# Patient Record
Sex: MALE | Race: WHITE | NOT HISPANIC OR LATINO | Employment: FULL TIME | URBAN - METROPOLITAN AREA
[De-identification: names, ages, dates, MRNs, and addresses within clinical notes are randomized per-mention and may not be internally consistent; named-entity substitution may affect disease eponyms.]

---

## 2017-02-15 ENCOUNTER — ALLSCRIPTS OFFICE VISIT (OUTPATIENT)
Dept: OTHER | Facility: OTHER | Age: 32
End: 2017-02-15

## 2017-09-12 ENCOUNTER — ALLSCRIPTS OFFICE VISIT (OUTPATIENT)
Dept: OTHER | Facility: OTHER | Age: 32
End: 2017-09-12

## 2017-10-16 ENCOUNTER — ALLSCRIPTS OFFICE VISIT (OUTPATIENT)
Dept: OTHER | Facility: OTHER | Age: 32
End: 2017-10-16
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
## 2018-01-12 VITALS
DIASTOLIC BLOOD PRESSURE: 86 MMHG | WEIGHT: 216 LBS | HEIGHT: 73 IN | BODY MASS INDEX: 28.63 KG/M2 | SYSTOLIC BLOOD PRESSURE: 132 MMHG

## 2018-01-14 VITALS
WEIGHT: 218.38 LBS | BODY MASS INDEX: 29.58 KG/M2 | SYSTOLIC BLOOD PRESSURE: 138 MMHG | DIASTOLIC BLOOD PRESSURE: 86 MMHG | HEIGHT: 72 IN

## 2018-01-14 VITALS
DIASTOLIC BLOOD PRESSURE: 88 MMHG | BODY MASS INDEX: 29.82 KG/M2 | WEIGHT: 225 LBS | HEIGHT: 73 IN | SYSTOLIC BLOOD PRESSURE: 142 MMHG

## 2018-02-20 ENCOUNTER — OFFICE VISIT (OUTPATIENT)
Dept: FAMILY MEDICINE CLINIC | Facility: CLINIC | Age: 33
End: 2018-02-20
Payer: COMMERCIAL

## 2018-02-20 VITALS
DIASTOLIC BLOOD PRESSURE: 82 MMHG | SYSTOLIC BLOOD PRESSURE: 154 MMHG | TEMPERATURE: 98.8 F | BODY MASS INDEX: 29.83 KG/M2 | WEIGHT: 223 LBS

## 2018-02-20 DIAGNOSIS — J06.9 UPPER RESPIRATORY TRACT INFECTION, UNSPECIFIED TYPE: Primary | ICD-10-CM

## 2018-02-20 DIAGNOSIS — R05.9 COUGH: ICD-10-CM

## 2018-02-20 DIAGNOSIS — I10 BENIGN ESSENTIAL HYPERTENSION: ICD-10-CM

## 2018-02-20 PROBLEM — M24.80 JOINT CREPITUS: Status: ACTIVE | Noted: 2017-09-12

## 2018-02-20 PROBLEM — G47.00 INSOMNIA: Status: ACTIVE | Noted: 2017-09-12

## 2018-02-20 PROCEDURE — 99213 OFFICE O/P EST LOW 20 MIN: CPT | Performed by: NURSE PRACTITIONER

## 2018-02-20 RX ORDER — MOMETASONE FUROATE 1 MG/G
CREAM TOPICAL DAILY
COMMUNITY
Start: 2012-07-12 | End: 2018-02-20

## 2018-02-20 RX ORDER — ALBUTEROL SULFATE 90 UG/1
2 AEROSOL, METERED RESPIRATORY (INHALATION)
COMMUNITY
Start: 2012-07-12 | End: 2018-08-30 | Stop reason: SDUPTHER

## 2018-02-20 RX ORDER — PREDNISONE 20 MG/1
40 TABLET ORAL DAILY
Qty: 6 TABLET | Refills: 0 | Status: SHIPPED | OUTPATIENT
Start: 2018-02-20 | End: 2018-02-23

## 2018-02-20 RX ORDER — AZITHROMYCIN 250 MG/1
TABLET, FILM COATED ORAL
Qty: 6 TABLET | Refills: 0 | Status: SHIPPED | OUTPATIENT
Start: 2018-02-20 | End: 2018-02-24

## 2018-02-20 RX ORDER — ZOLPIDEM TARTRATE 12.5 MG/1
1 TABLET, FILM COATED, EXTENDED RELEASE ORAL
COMMUNITY
Start: 2017-10-16 | End: 2018-02-20

## 2018-02-20 NOTE — PROGRESS NOTES
Assessment/Plan:    Benign essential hypertension  Pressure is slightly elevated at today's visit  Most likely due to frequent use of decongestants  He was educated on safe cold medications to take that will not elevate blood pressure such as Coricidin HBP or Vicks HBP  Patient was on medication for hypertension in the past   Patient does have a blood pressure machine at home  He was educated to take his blood pressure a few times at home and let us know if it is consistently above 140s over 90s  Patient was not thrilled with the idea because he does not want to be on anything for his blood pressure because in the past he says it made him feel very out of it  He cancelled follow up with Dr Rachael Pop recently because he was sick  He said he will call to reschedule follow up  Upper Respiratory Infection and Cough:   He is to start prednisone 40 mg daily for 3 days and azithromycin 5 day course for symptoms  Patient was told to continue with over-the-counter medication if these help symptoms but to stay away from the decongestants that increase his blood pressure  He was advised to use the Coricidin HBP or Vicks HBP  He can continue to use Ventolin inhaler as needed  If he finds he is using it frequently he is to let us know and we can start him on something daily to keep the Ventolin PRN  He is to call if symptoms worsen or do not improve  A note for work was given  He also has stopped taking Ambien because it doesn't help him  He says he uses Zzzquil at night for a sleep aid since he has a weird work schedule and gets home at 3:00am and can't sleep  Patient was educated that Zzzquil is just benadryl but more expensive  He was advised that this can be used for short term insomnia but not long term like he's using it because he can build a tolerance to it  He says he uses it 2-3x a week   He is due for follow up with Dr Rachael Pop to address insomnia and other chronic problems which he will call and make appointment for  Diagnoses and all orders for this visit:    Upper respiratory tract infection, unspecified type  -     predniSONE 20 mg tablet; Take 2 tablets (40 mg total) by mouth daily for 3 days  -     azithromycin (ZITHROMAX) 250 mg tablet; Take 2 tablets on day 1 (500mg total), then 1 (250mg total) tablet for the following four days  Cough  -     predniSONE 20 mg tablet; Take 2 tablets (40 mg total) by mouth daily for 3 days    Benign essential hypertension    Other orders  -     Discontinue: mometasone (ELOCON) 0 1 % cream; Apply topically daily  -     albuterol (VENTOLIN HFA) 90 mcg/act inhaler; Inhale 2 puffs  -     Discontinue: zolpidem (AMBIEN CR) 12 5 MG CR tablet; Take 1 tablet by mouth        Patient Instructions   Start prednisone 40mg daily for 3 days total  Start antibiotic which is 2 pills on day 1, then 1 pill for following 4 days  Stop taking Nyquil and Dayquil and start taking Coricidin HBP or Vicks HBP which is much safer for your blood pressure  Can take plain mucinex to help drain sinuses and help cough up mucous  This works best when you drink a lot of water with it  Stay hydrated and rest    Please make follow up appointment with Dr Kiran Gonzalez  Subjective:        Patient ID: Bhanu Grijalva is a 34 y o  male  Chief Complaint   Patient presents with    Cough    Fatigue    Chills    Wheezing    Dizziness     x 4 days       Patient presents for symptoms of cough, fatigue, chills, wheezing, dizziness for 4 days  Patient had possible sinus infection symptoms for weeks 4 weeks ago  Has not taken antibiotic for that  He's been trying Alkaseltzer which helps with pressure and using nyquil and dayquil with no relief  He has days off next week and doesn't want to be sick for it  Cough   This is a new problem  Episode onset: Friday  The cough is productive of sputum   Associated symptoms include nasal congestion, rhinorrhea, a sore throat (mild), shortness of breath and wheezing  Pertinent negatives include no chest pain, chills (resolved), ear congestion, ear pain, fever (resolved), headaches, hemoptysis, postnasal drip or rash  Associated symptoms comments: Fatigue    Nothing aggravates the symptoms  Treatments tried: Alkaseltzer and dayquil and nyquil  Dizziness   Associated symptoms include congestion, coughing and a sore throat (mild)  Pertinent negatives include no abdominal pain, chest pain, chills (resolved), fever (resolved), headaches, nausea or rash  Review of Systems   Constitutional: Negative for chills (resolved) and fever (resolved)  HENT: Positive for congestion, rhinorrhea and sore throat (mild)  Negative for ear pain, postnasal drip, sinus pain and sinus pressure  Eyes: Negative for discharge  Respiratory: Positive for cough, shortness of breath and wheezing  Negative for hemoptysis  Cardiovascular: Negative for chest pain  Gastrointestinal: Negative for abdominal pain, constipation, diarrhea and nausea  Genitourinary: Negative for difficulty urinating and dysuria  Skin: Negative for rash  Neurological: Positive for dizziness (comes and goes, worse with exertion)  Negative for headaches  Psychiatric/Behavioral: Negative for agitation  Objective:  /82   Temp 98 8 °F (37 1 °C)   Wt 101 kg (223 lb)   BMI 29 83 kg/m²      Physical Exam   Constitutional: He is oriented to person, place, and time  He appears well-developed  No distress  HENT:   Head: Normocephalic and atraumatic  Right Ear: External ear normal    Left Ear: External ear normal    Nose: Nose normal    Mouth/Throat: Oropharynx is clear and moist  No oropharyngeal exudate  Eyes: Conjunctivae and lids are normal  Right eye exhibits no discharge  Left eye exhibits no discharge  Neck: Neck supple  No tracheal deviation present  Cardiovascular: Normal rate and regular rhythm  No murmur heard    Pulmonary/Chest: Effort normal and breath sounds normal  No respiratory distress  He has no wheezes  Rhonchi clears with coughing   Abdominal: Soft  Bowel sounds are normal  He exhibits no distension  There is no tenderness  There is no guarding  Musculoskeletal: He exhibits no edema or deformity  Lymphadenopathy:     He has no cervical adenopathy  Neurological: He is alert and oriented to person, place, and time  Skin: Skin is warm and dry  No rash noted  He is not diaphoretic  No erythema  Psychiatric: He has a normal mood and affect  His speech is normal and behavior is normal  Judgment and thought content normal  Cognition and memory are normal    Nursing note and vitals reviewed

## 2018-02-20 NOTE — ASSESSMENT & PLAN NOTE
Pressure is slightly elevated at today's visit  Most likely due to frequent use of decongestants  He was educated on safe cold medications to take that will not elevate blood pressure such as Coricidin HBP or Vicks HBP  Patient was on medication for hypertension in the past   Patient does have a blood pressure machine at home  He was educated to take his blood pressure a few times at home and let us know if it is consistently above 140s over 90s  Patient was not thrilled with the idea because he does not want to be on anything for his blood pressure because in the past he says it made him feel very out of it  He cancelled follow up with Dr Vannessa Kinsey recently because he was sick  He said he will call to reschedule follow up

## 2018-02-20 NOTE — PATIENT INSTRUCTIONS
Start prednisone 40mg daily for 3 days total  Start antibiotic which is 2 pills on day 1, then 1 pill for following 4 days  Stop taking Nyquil and Dayquil and start taking Coricidin HBP or Vicks HBP which is much safer for your blood pressure  Can take plain mucinex to help drain sinuses and help cough up mucous  This works best when you drink a lot of water with it  Stay hydrated and rest  Take blood pressure at home and call if consistently elevated >140/90  Please make follow up appointment with Dr Calvin Stanton

## 2018-02-20 NOTE — LETTER
February 20, 2018     Patient: Gretel Denise   YOB: 1985   Date of Visit: 2/20/2018       To Whom it May Concern:    Nilam Braswell is under my professional care  He was seen in my office on 2/20/2018  He may return to work on 2/22/18  If you have any questions or concerns, please don't hesitate to call           Sincerely,          FRANCISCO Crain        CC: No Recipients

## 2018-08-30 ENCOUNTER — OFFICE VISIT (OUTPATIENT)
Dept: FAMILY MEDICINE CLINIC | Facility: CLINIC | Age: 33
End: 2018-08-30
Payer: COMMERCIAL

## 2018-08-30 VITALS — DIASTOLIC BLOOD PRESSURE: 88 MMHG | SYSTOLIC BLOOD PRESSURE: 156 MMHG | BODY MASS INDEX: 28.52 KG/M2 | WEIGHT: 213.2 LBS

## 2018-08-30 DIAGNOSIS — F41.9 ANXIETY: Primary | ICD-10-CM

## 2018-08-30 DIAGNOSIS — R03.0 ELEVATED BLOOD PRESSURE, SITUATIONAL: ICD-10-CM

## 2018-08-30 PROCEDURE — 99213 OFFICE O/P EST LOW 20 MIN: CPT | Performed by: FAMILY MEDICINE

## 2018-08-30 RX ORDER — CITALOPRAM 10 MG/1
10 TABLET ORAL DAILY
Qty: 30 TABLET | Refills: 3 | Status: SHIPPED | OUTPATIENT
Start: 2018-08-30 | End: 2018-09-27 | Stop reason: SDUPTHER

## 2018-08-30 RX ORDER — ALBUTEROL SULFATE 90 UG/1
2 AEROSOL, METERED RESPIRATORY (INHALATION) EVERY 6 HOURS PRN
COMMUNITY
End: 2019-01-14 | Stop reason: SDUPTHER

## 2018-08-30 NOTE — PROGRESS NOTES
Assessment/Plan:  Chief Complaint   Patient presents with    Anxiety     Patient Instructions   Here for anxiety and insomnia and elevated BP  Start Citalopram 10 mg once daily and call if any problems  No problem-specific Assessment & Plan notes found for this encounter  Diagnoses and all orders for this visit:    Anxiety  -     citalopram (CeleXA) 10 mg tablet; Take 1 tablet (10 mg total) by mouth daily    Elevated blood pressure, situational    Other orders  -     albuterol (PROVENTIL HFA,VENTOLIN HFA) 90 mcg/act inhaler; Inhale 2 puffs every 6 (six) hours as needed for wheezing          Subjective:      Patient ID: Zahra Muller is a 28 y o  male  Here for anxiety and stress  Anxiety             The following portions of the patient's history were reviewed and updated as appropriate: allergies, current medications, past family history, past medical history, past social history, past surgical history and problem list     Review of Systems   Constitutional: Negative  HENT: Negative  Eyes: Negative  Respiratory: Negative  Cardiovascular: Negative  Gastrointestinal: Negative  Endocrine: Negative  Genitourinary: Negative  Musculoskeletal: Negative  Skin: Negative  Allergic/Immunologic: Negative  Neurological: Negative  Hematological: Negative  Psychiatric/Behavioral:        Anxiety         Objective:      /88 (BP Location: Left arm, Patient Position: Sitting, Cuff Size: Standard)   Wt 96 7 kg (213 lb 3 2 oz)   BMI 28 52 kg/m²          Physical Exam   Constitutional: He is oriented to person, place, and time  He appears well-developed and well-nourished  HENT:   Head: Normocephalic and atraumatic  Right Ear: External ear normal    Left Ear: External ear normal    Nose: Nose normal    Mouth/Throat: Oropharynx is clear and moist    Eyes: Conjunctivae and EOM are normal  Pupils are equal, round, and reactive to light     Neck: Normal range of motion  Neck supple  Cardiovascular: Normal rate, regular rhythm, normal heart sounds and intact distal pulses  Pulmonary/Chest: Effort normal and breath sounds normal    Musculoskeletal: Normal range of motion  Neurological: He is alert and oriented to person, place, and time  He has normal reflexes  Skin: Skin is warm and dry     Psychiatric: His behavior is normal    Anxiety

## 2018-08-30 NOTE — PATIENT INSTRUCTIONS
Here for anxiety and insomnia and elevated BP  Start Citalopram 10 mg once daily and call if any problems

## 2018-09-27 ENCOUNTER — OFFICE VISIT (OUTPATIENT)
Dept: FAMILY MEDICINE CLINIC | Facility: CLINIC | Age: 33
End: 2018-09-27
Payer: COMMERCIAL

## 2018-09-27 VITALS
WEIGHT: 213.2 LBS | SYSTOLIC BLOOD PRESSURE: 138 MMHG | DIASTOLIC BLOOD PRESSURE: 88 MMHG | HEIGHT: 72 IN | BODY MASS INDEX: 28.88 KG/M2

## 2018-09-27 DIAGNOSIS — J32.9 SINUSITIS, UNSPECIFIED CHRONICITY, UNSPECIFIED LOCATION: Primary | ICD-10-CM

## 2018-09-27 DIAGNOSIS — J45.909 UNCOMPLICATED ASTHMA, UNSPECIFIED ASTHMA SEVERITY, UNSPECIFIED WHETHER PERSISTENT: ICD-10-CM

## 2018-09-27 DIAGNOSIS — F41.9 ANXIETY: ICD-10-CM

## 2018-09-27 PROCEDURE — 3008F BODY MASS INDEX DOCD: CPT | Performed by: FAMILY MEDICINE

## 2018-09-27 PROCEDURE — 99214 OFFICE O/P EST MOD 30 MIN: CPT | Performed by: FAMILY MEDICINE

## 2018-09-27 RX ORDER — FLUTICASONE PROPIONATE 50 MCG
1 SPRAY, SUSPENSION (ML) NASAL DAILY
Qty: 16 G | Refills: 0 | Status: SHIPPED | OUTPATIENT
Start: 2018-09-27

## 2018-09-27 RX ORDER — CITALOPRAM 10 MG/1
10 TABLET ORAL DAILY
Qty: 30 TABLET | Refills: 5 | Status: SHIPPED | OUTPATIENT
Start: 2018-09-27

## 2018-09-27 RX ORDER — CEFDINIR 300 MG/1
300 CAPSULE ORAL EVERY 12 HOURS SCHEDULED
Qty: 14 CAPSULE | Refills: 0 | Status: SHIPPED | OUTPATIENT
Start: 2018-09-27 | End: 2018-10-04

## 2018-09-27 NOTE — PROGRESS NOTES
Assessment/Plan:  Chief Complaint   Patient presents with    Follow-up    Anxiety     Patient Instructions   Anxiety better and use abx and flonase as directed for sinusitis  Asthma stable  No problem-specific Assessment & Plan notes found for this encounter  Diagnoses and all orders for this visit:    Sinusitis, unspecified chronicity, unspecified location  -     fluticasone (FLONASE) 50 mcg/act nasal spray; 1 spray into each nostril daily  -     cefdinir (OMNICEF) 300 mg capsule; Take 1 capsule (300 mg total) by mouth every 12 (twelve) hours for 7 days    Anxiety  -     citalopram (CeleXA) 10 mg tablet; Take 1 tablet (10 mg total) by mouth daily    Uncomplicated asthma, unspecified asthma severity, unspecified whether persistent          Subjective:      Patient ID: Joe Sarakr is a 28 y o  male  Here for Anxiety and doing better and takes Citalopram 10 mg daily and has no side effects  He feels much better  No cp or sob, or ha  The following portions of the patient's history were reviewed and updated as appropriate: allergies, current medications, past family history, past medical history, past social history, past surgical history and problem list     Review of Systems   Constitutional: Negative  HENT: Negative  Eyes: Negative  Respiratory: Negative  Cardiovascular: Negative  Gastrointestinal: Negative  Endocrine: Negative  Genitourinary: Negative  Musculoskeletal: Negative  Skin: Negative  Allergic/Immunologic: Negative  Neurological: Negative  Hematological: Negative  Psychiatric/Behavioral: Negative  Anxiety better         Objective:      /88   Ht 5' 11 5" (1 816 m)   Wt 96 7 kg (213 lb 3 2 oz)   BMI 29 32 kg/m²          Physical Exam   Constitutional: He is oriented to person, place, and time  He appears well-developed and well-nourished  HENT:   Head: Normocephalic and atraumatic     Right Ear: External ear normal  Left Ear: External ear normal    Nose: Nose normal    Mouth/Throat: Oropharynx is clear and moist    Eyes: Pupils are equal, round, and reactive to light  Conjunctivae and EOM are normal    Neck: Normal range of motion  Neck supple  Cardiovascular: Normal rate, regular rhythm, normal heart sounds and intact distal pulses  Pulmonary/Chest: Effort normal and breath sounds normal    Musculoskeletal: Normal range of motion  Neurological: He is alert and oriented to person, place, and time  He has normal reflexes  Skin: Skin is warm and dry  Psychiatric: He has a normal mood and affect   His behavior is normal    Anxiety better

## 2018-12-14 ENCOUNTER — TELEPHONE (OUTPATIENT)
Dept: FAMILY MEDICINE CLINIC | Facility: CLINIC | Age: 33
End: 2018-12-14
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
## 2019-01-14 ENCOUNTER — OFFICE VISIT (OUTPATIENT)
Dept: FAMILY MEDICINE CLINIC | Facility: CLINIC | Age: 34
End: 2019-01-14
Payer: COMMERCIAL

## 2019-01-14 VITALS
DIASTOLIC BLOOD PRESSURE: 92 MMHG | SYSTOLIC BLOOD PRESSURE: 144 MMHG | HEIGHT: 72 IN | WEIGHT: 237.4 LBS | BODY MASS INDEX: 32.15 KG/M2

## 2019-01-14 DIAGNOSIS — R05.9 COUGH: ICD-10-CM

## 2019-01-14 DIAGNOSIS — I10 BENIGN ESSENTIAL HYPERTENSION: ICD-10-CM

## 2019-01-14 DIAGNOSIS — F41.9 ANXIETY: ICD-10-CM

## 2019-01-14 DIAGNOSIS — J45.909 UNCOMPLICATED ASTHMA, UNSPECIFIED ASTHMA SEVERITY, UNSPECIFIED WHETHER PERSISTENT: Primary | ICD-10-CM

## 2019-01-14 PROCEDURE — 99214 OFFICE O/P EST MOD 30 MIN: CPT | Performed by: FAMILY MEDICINE

## 2019-01-14 PROCEDURE — 3008F BODY MASS INDEX DOCD: CPT | Performed by: FAMILY MEDICINE

## 2019-01-14 RX ORDER — PREDNISONE 10 MG/1
TABLET ORAL
Qty: 21 TABLET | Refills: 0 | Status: SHIPPED | OUTPATIENT
Start: 2019-01-14

## 2019-01-14 RX ORDER — ALBUTEROL SULFATE 90 UG/1
2 AEROSOL, METERED RESPIRATORY (INHALATION) EVERY 6 HOURS PRN
Qty: 2 INHALER | Refills: 2 | Status: SHIPPED | OUTPATIENT
Start: 2019-01-14

## 2019-01-14 RX ORDER — TRIAMCINOLONE ACETONIDE 1 MG/G
CREAM TOPICAL
Refills: 0 | COMMUNITY
Start: 2018-12-28

## 2019-01-14 RX ORDER — CEFDINIR 300 MG/1
300 CAPSULE ORAL EVERY 12 HOURS SCHEDULED
Qty: 14 CAPSULE | Refills: 0 | Status: SHIPPED | OUTPATIENT
Start: 2019-01-14 | End: 2019-01-21

## 2019-01-14 NOTE — PROGRESS NOTES
Assessment/Plan:  Chief Complaint   Patient presents with    Follow-up    Anxiety     no taking medication    Asthma     flaring up for the past 5 weeks  Went through 2 inhalers, but is helping   Cough     Patient Instructions   Lose weight as directed and monitor BP and also recheck BP in 1 month  Asthma symptoms, start abx for hx of cough/uri and start prednisone and also refilled inhaler  Lose weight as directed  Anxiety stable, take Citalopram 10 mg daily as directed, he was not using it as directed for anxiety  No problem-specific Assessment & Plan notes found for this encounter  Diagnoses and all orders for this visit:    Uncomplicated asthma, unspecified asthma severity, unspecified whether persistent  -     predniSONE 10 mg tablet; Take 60 mg po day#1, 50 mg po day#2, 40 mg po day#3, 30 mg po day#4, 20 mg po day#5m and 10 mg po day#6  -     albuterol (PROVENTIL HFA,VENTOLIN HFA) 90 mcg/act inhaler; Inhale 2 puffs every 6 (six) hours as needed for wheezing    Anxiety    Cough  -     predniSONE 10 mg tablet; Take 60 mg po day#1, 50 mg po day#2, 40 mg po day#3, 30 mg po day#4, 20 mg po day#5m and 10 mg po day#6  -     cefdinir (OMNICEF) 300 mg capsule; Take 1 capsule (300 mg total) by mouth every 12 (twelve) hours for 7 days    Benign essential hypertension          Subjective:      Patient ID: Randy Partida is a 28 y o  male  Follow-up   Anxiety (no taking medication)  Asthma (flaring up for the past 5 weeks  Went through 2 inhalers, but is helping )  Cough     Was sick and coughing bad and fever went away but asthma is getting better  The following portions of the patient's history were reviewed and updated as appropriate: allergies, current medications, past family history, past medical history, past social history, past surgical history and problem list     Review of Systems   Constitutional: Negative  HENT: Negative  Eyes: Negative      Respiratory:        Cough Cardiovascular: Negative  Gastrointestinal: Negative  Endocrine: Negative  Genitourinary: Negative  Musculoskeletal: Negative  Skin: Negative  Allergic/Immunologic: Negative  Neurological: Negative  Hematological: Negative  Psychiatric/Behavioral:        Anxiety         Objective:      /92   Ht 5' 11 5" (1 816 m)   Wt 108 kg (237 lb 6 4 oz)   BMI 32 65 kg/m²          Physical Exam   Constitutional: He is oriented to person, place, and time  He appears well-developed and well-nourished  HENT:   Head: Normocephalic and atraumatic  Right Ear: External ear normal    Left Ear: External ear normal    Nose: Nose normal    Mouth/Throat: Oropharynx is clear and moist    Eyes: Pupils are equal, round, and reactive to light  Conjunctivae and EOM are normal    Neck: Normal range of motion  Neck supple  Cardiovascular: Normal rate, regular rhythm, normal heart sounds and intact distal pulses  Pulmonary/Chest: Effort normal and breath sounds normal    Musculoskeletal: Normal range of motion  Neurological: He is alert and oriented to person, place, and time  He has normal reflexes  Skin: Skin is warm and dry  Psychiatric: He has a normal mood and affect   His behavior is normal    Anxiety stable

## 2019-01-14 NOTE — PATIENT INSTRUCTIONS
Lose weight as directed and monitor BP and also recheck BP in 1 month  Asthma symptoms, start abx for hx of cough/uri and start prednisone and also refilled inhaler  Lose weight as directed  Anxiety stable, take Citalopram 10 mg daily as directed, he was not using it as directed for anxiety

## 2021-09-05 ENCOUNTER — OFFICE VISIT (OUTPATIENT)
Dept: URGENT CARE | Age: 36
End: 2021-09-05
Payer: COMMERCIAL

## 2021-09-05 VITALS
HEART RATE: 113 BPM | HEIGHT: 72 IN | OXYGEN SATURATION: 96 % | SYSTOLIC BLOOD PRESSURE: 134 MMHG | BODY MASS INDEX: 29.8 KG/M2 | TEMPERATURE: 100.1 F | WEIGHT: 220 LBS | DIASTOLIC BLOOD PRESSURE: 99 MMHG

## 2021-09-05 DIAGNOSIS — Z11.59 SPECIAL SCREENING EXAMINATION FOR UNSPECIFIED VIRAL DISEASE: ICD-10-CM

## 2021-09-05 DIAGNOSIS — B34.9 VIRAL SYNDROME: Primary | ICD-10-CM

## 2021-09-05 PROCEDURE — 99213 OFFICE O/P EST LOW 20 MIN: CPT | Performed by: PHYSICIAN ASSISTANT

## 2021-09-05 PROCEDURE — 87635 SARS-COV-2 COVID-19 AMP PRB: CPT | Performed by: PHYSICIAN ASSISTANT

## 2021-09-05 NOTE — PATIENT INSTRUCTIONS
Check or sign up for [facility] my Chart to view your results  We do not call patient's with negative results  Go directly home after today's visit, quarantine until you receive a negative result  If you have a positive result you need to quarantine at home for a minimum of 10 days  You may end your quarantine when you are symptoms free without medications to reduce fever (e g  acetaminophen/Tylenol) for 72 hours  Recommend over the counter antihistamines such as Claratin, Allegra, Zyrtec, or Benadryl for congestion  You may also use over the counter nasal sprays such as Flonase for this  Over the counter lozenges such as Cepacol, Ricola, Halls, or Chloroseptic can be used for sore throat symptoms  Recommend Vitamin C 1,000 mg twice daily, Vitamin D3 2000 IU daily, multivitamin and Zinc for immune support  If your symptoms worsen or you develop shortness of breath report to the nearest emergency room  Check cdc gov for most current guidelines as guidelines are subject to change as we learn more about the virus  [address]    Your healthcare provider and/or public health staff have evaluated you and have determined that you do not need to remain in the hospital at this time  At this time you can be isolated at home where you will be monitored by staff from your local or state health department  You should carefully follow the prevention and isolation steps below until a healthcare provider or local or state health department says that you can return to your normal activities  Stay home except to get medical care    People who are mildly ill with COVID-19 are able to isolate at home during their illness  You should restrict activities outside your home, except for getting medical care  Do not go to work, school, or public areas  Avoid using public transportation, ride-sharing, or taxis      Separate yourself from other people and animals in your home    People: As much as possible, you should stay in a specific room and away from other people in your home  Also, you should use a separate bathroom, if available  Animals: You should restrict contact with pets and other animals while you are sick with COVID-19, just like you would around other people  Although there have not been reports of pets or other animals becoming sick with COVID-19, it is still recommended that people sick with COVID-19 limit contact with animals until more information is known about the virus  When possible, have another member of your household care for your animals while you are sick  If you are sick with COVID-19, avoid contact with your pet, including petting, snuggling, being kissed or licked, and sharing food  If you must care for your pet or be around animals while you are sick, wash your hands before and after you interact with pets and wear a facemask  See COVID-19 and Animals for more information  Call ahead before visiting your doctor    If you have a medical appointment, call the healthcare provider and tell them that you have or may have COVID-19  This will help the healthcare providers office take steps to keep other people from getting infected or exposed  Wear a facemask    You should wear a facemask when you are around other people (e g , sharing a room or vehicle) or pets and before you enter a healthcare providers office  If you are not able to wear a facemask (for example, because it causes trouble breathing), then people who live with you should not stay in the same room with you, or they should wear a facemask if they enter your room  Cover your coughs and sneezes    Cover your mouth and nose with a tissue when you cough or sneeze  Throw used tissues in a lined trash can   Immediately wash your hands with soap and water for at least 20 seconds or, if soap and water are not available, clean your hands with an alcohol-based hand  that contains at least 60% alcohol  Clean your hands often    Wash your hands often with soap and water for at least 20 seconds, especially after blowing your nose, coughing, or sneezing; going to the bathroom; and before eating or preparing food  If soap and water are not readily available, use an alcohol-based hand  with at least 60% alcohol, covering all surfaces of your hands and rubbing them together until they feel dry  Soap and water are the best option if hands are visibly dirty  Avoid touching your eyes, nose, and mouth with unwashed hands  Avoid sharing personal household items    You should not share dishes, drinking glasses, cups, eating utensils, towels, or bedding with other people or pets in your home  After using these items, they should be washed thoroughly with soap and water  Clean all high-touch surfaces everyday    High touch surfaces include counters, tabletops, doorknobs, bathroom fixtures, toilets, phones, keyboards, tablets, and bedside tables  Also, clean any surfaces that may have blood, stool, or body fluids on them  Use a household cleaning spray or wipe, according to the label instructions  Labels contain instructions for safe and effective use of the cleaning product including precautions you should take when applying the product, such as wearing gloves and making sure you have good ventilation during use of the product  Monitor your symptoms    Seek prompt medical attention if your illness is worsening (e g , difficulty breathing)  Before seeking care, call your healthcare provider and tell them that you have, or are being evaluated for, COVID-19  Put on a facemask before you enter the facility  These steps will help the healthcare providers office to keep other people in the office or waiting room from getting infected or exposed  Ask your healthcare provider to call the local or state health department   Persons who are placed under active monitoring or facilitated self-monitoring should follow instructions provided by their local health department or occupational health professionals, as appropriate  If you have a medical emergency and need to call 911, notify the dispatch personnel that you have, or are being evaluated for COVID-19  If possible, put on a facemask before emergency medical services arrive  Discontinuing home isolation    Patients with confirmed COVID-19 should remain under home isolation precautions until the following conditions are met:   - They have had no fever for at least 24 hours (that is one full day of no fever without the use medicine that reduces fevers)  AND  - other symptoms have improved (for example, when their cough or shortness of breath have improved)  AND  - If had mild or moderate illness, at least 10 days have passed since their symptoms first appeared or if severe illness (needed oxygen) or immunosuppressed, at least 20 days have passed since symptoms first appeared  Patients with confirmed COVID-19 should also notify close contacts (including their workplace) and ask that they self-quarantine  Currently, close contact is defined as being within 6 feet for 15 minutes or more from the period 24 hours starting 48 hours before symptom onset to the time at which the patient went into isolation  Close contacts of patients diagnosed with COVID-19 should be instructed by the patient to self-quarantine for 14 days from the last time of their last contact with the patient       Source: [employer]

## 2021-09-05 NOTE — LETTER
September 5, 2021     Patient: Kartik Bills   YOB: 1985   Date of Visit: 9/5/2021       To Whom It May Concern: It is my medical opinion that Kuldeep  should remain out of work until negative test result  Pt may work from home if remote work is available       If you have any questions or concerns, please don't hesitate to call           Sincerely,        Ariel Lilly PA-C    CC: No Recipients

## 2021-09-06 LAB — SARS-COV-2 RNA RESP QL NAA+PROBE: NEGATIVE

## 2021-09-06 NOTE — PROGRESS NOTES
[facility]        NAME: Sol Velasquez is a 39 y o  male  : 1985    MRN: [id number]  DATE: 2021  TIME: 10:19 PM    Assessment and Plan   Viral syndrome [B34 9]  1  Viral syndrome  Novel Coronavirus (Covid-19),PCR [facility] - Office Collection   2  Special screening examination for unspecified viral disease     Pt presents with symptoms consistent with possible COVID 19 infection  I also offered flu testing at this time patient declines  Pt will be tested in accordance with CDC guidelines and recommendations for symptomatic individuals regardless of vaccination status  We discussed quarantine protocols and symptomatic treatments  He is instructed to take Tylenol as needed for fever and body aches  He will ensure good fluid intake and rest  He is instructed to remain out of work for duration of fever even if COVID test is negative  The pt may follow-up with their PCP if symptoms are not improved in 2-3 days and COVID test is negative  Pt will report to the emergency department if symptoms worsen  Patient Instructions     Patient Instructions   Check or sign up for [facility]  Chart to view your results  We do not call patient's with negative results  Go directly home after today's visit, quarantine until you receive a negative result  If you have a positive result you need to quarantine at home for a minimum of 10 days  You may end your quarantine when you are symptoms free without medications to reduce fever (e g  acetaminophen/Tylenol) for 72 hours  Recommend over the counter antihistamines such as Claratin, Allegra, Zyrtec, or Benadryl for congestion  You may also use over the counter nasal sprays such as Flonase for this  Over the counter lozenges such as Cepacol, Ricola, Halls, or Chloroseptic can be used for sore throat symptoms  Recommend Vitamin C 1,000 mg twice daily, Vitamin D3 2000 IU daily, multivitamin and Zinc for immune support     If your symptoms worsen or you develop shortness of breath report to the nearest emergency room  Check cdc gov for most current guidelines as guidelines are subject to change as we learn more about the virus  [address]    Your healthcare provider and/or public health staff have evaluated you and have determined that you do not need to remain in the hospital at this time  At this time you can be isolated at home where you will be monitored by staff from your local or state health department  You should carefully follow the prevention and isolation steps below until a healthcare provider or local or state health department says that you can return to your normal activities  Stay home except to get medical care    People who are mildly ill with COVID-19 are able to isolate at home during their illness  You should restrict activities outside your home, except for getting medical care  Do not go to work, school, or public areas  Avoid using public transportation, ride-sharing, or taxis  Separate yourself from other people and animals in your home    People: As much as possible, you should stay in a specific room and away from other people in your home  Also, you should use a separate bathroom, if available  Animals: You should restrict contact with pets and other animals while you are sick with COVID-19, just like you would around other people  Although there have not been reports of pets or other animals becoming sick with COVID-19, it is still recommended that people sick with COVID-19 limit contact with animals until more information is known about the virus  When possible, have another member of your household care for your animals while you are sick  If you are sick with COVID-19, avoid contact with your pet, including petting, snuggling, being kissed or licked, and sharing food   If you must care for your pet or be around animals while you are sick, wash your hands before and after you interact with pets and wear a facemask  See COVID-19 and Animals for more information  Call ahead before visiting your doctor    If you have a medical appointment, call the healthcare provider and tell them that you have or may have COVID-19  This will help the healthcare providers office take steps to keep other people from getting infected or exposed  Wear a facemask    You should wear a facemask when you are around other people (e g , sharing a room or vehicle) or pets and before you enter a healthcare providers office  If you are not able to wear a facemask (for example, because it causes trouble breathing), then people who live with you should not stay in the same room with you, or they should wear a facemask if they enter your room  Cover your coughs and sneezes    Cover your mouth and nose with a tissue when you cough or sneeze  Throw used tissues in a lined trash can  Immediately wash your hands with soap and water for at least 20 seconds or, if soap and water are not available, clean your hands with an alcohol-based hand  that contains at least 60% alcohol  Clean your hands often    Wash your hands often with soap and water for at least 20 seconds, especially after blowing your nose, coughing, or sneezing; going to the bathroom; and before eating or preparing food  If soap and water are not readily available, use an alcohol-based hand  with at least 60% alcohol, covering all surfaces of your hands and rubbing them together until they feel dry  Soap and water are the best option if hands are visibly dirty  Avoid touching your eyes, nose, and mouth with unwashed hands  Avoid sharing personal household items    You should not share dishes, drinking glasses, cups, eating utensils, towels, or bedding with other people or pets in your home  After using these items, they should be washed thoroughly with soap and water      Clean all high-touch surfaces everyday    High touch surfaces include counters, tabletops, doorknobs, bathroom fixtures, toilets, phones, keyboards, tablets, and bedside tables  Also, clean any surfaces that may have blood, stool, or body fluids on them  Use a household cleaning spray or wipe, according to the label instructions  Labels contain instructions for safe and effective use of the cleaning product including precautions you should take when applying the product, such as wearing gloves and making sure you have good ventilation during use of the product  Monitor your symptoms    Seek prompt medical attention if your illness is worsening (e g , difficulty breathing)  Before seeking care, call your healthcare provider and tell them that you have, or are being evaluated for, COVID-19  Put on a facemask before you enter the facility  These steps will help the healthcare providers office to keep other people in the office or waiting room from getting infected or exposed  Ask your healthcare provider to call the local or [facility] health department  Persons who are placed under active monitoring or facilitated self-monitoring should follow instructions provided by their local health department or occupational health professionals, as appropriate  If you have a medical emergency and need to call 911, notify the dispatch personnel that you have, or are being evaluated for COVID-19  If possible, put on a facemask before emergency medical services arrive      Discontinuing home isolation    Patients with confirmed COVID-19 should remain under home isolation precautions until the following conditions are met:   - They have had no fever for at least 24 hours (that is one full day of no fever without the use medicine that reduces fevers)  AND  - other symptoms have improved (for example, when their cough or shortness of breath have improved)  AND  - If had mild or moderate illness, at least 10 days have passed since their symptoms first appeared or if severe illness (needed oxygen) or immunosuppressed, at least 20 days have passed since symptoms first appeared  Patients with confirmed COVID-19 should also notify close contacts (including their workplace) and ask that they self-quarantine  Currently, close contact is defined as being within 6 feet for 15 minutes or more from the period 24 hours starting 48 hours before symptom onset to the time at which the patient went into isolation  Close contacts of patients diagnosed with COVID-19 should be instructed by the patient to self-quarantine for 14 days from the last time of their last contact with the patient  Source: [employer]         Follow up with PCP in 3-5 days  Proceed to  ER if symptoms worsen  Chief Complaint     Chief Complaint   Patient presents with   31 [id number]     started a few days ago  took motrin yesterday for head, also flu pm to sleep, body ache, feels warm, no sore throat or congestion - not vaccinated- very fatigue          History of Present Illness        39year old male presents with complaints of  Fever, chills, body aches, and fatigue for 3 days duration  Pt denies  runny nose, sore throat, cough, headache, shortness of breath, chest pain, nausea, vomiting, diarrhea,and loss of taste and smell  Pt has not been exposed to anyone who has tested positive for COVID to their knowledge  Patient states that he does occasionally work in [place] and has been out of the state  He denies history of asthma He denies smoking and use of e-cigarettes  He has taken Motrin with minimal benefit at this time  He is not vaccinated against COVID 19  No other concerns or complaints today  Review of Systems   Review of Systems   Constitutional: Positive for chills, fatigue and fever  HENT: Negative for congestion, postnasal drip, rhinorrhea and sore throat  Respiratory: Negative for cough and shortness of breath  Cardiovascular: Negative for chest pain  Gastrointestinal: Negative for diarrhea, nausea and vomiting  Musculoskeletal: Positive for myalgias  Neurological: Negative for headaches  Current Medications       Current Outpatient Medications:     albuterol (PROVENTIL HFA,VENTOLIN HFA) 90 mcg/act inhaler, Inhale 2 puffs every 6 (six) hours as needed for wheezing, Disp: 2 Inhaler, Rfl: 2    citalopram (CeleXA) 10 mg tablet, Take 1 tablet (10 mg total) by mouth daily (Patient not taking: Reported on 1/14/2019 ), Disp: 30 tablet, Rfl: 5    fluticasone (FLONASE) 50 mcg/act nasal spray, 1 spray into each nostril daily (Patient not taking: Reported on 1/14/2019 ), Disp: 16 g, Rfl: 0    predniSONE 10 mg tablet, Take 60 mg po day#1, 50 mg po day#2, 40 mg po day#3, 30 mg po day#4, 20 mg po day#5m and 10 mg po day#6 (Patient not taking: Reported on 9/5/2021), Disp: 21 tablet, Rfl: 0    triamcinolone (KENALOG) 0 1 % cream, APPLY TO NECK ONCE A WEEK AFTER SHAVING (Patient not taking: Reported on 9/5/2021), Disp: , Rfl: 0    Current Allergies     Allergies as of 09/05/2021    (No Known Allergies)            The following portions of the patient's history were reviewed and updated as appropriate: allergies, current medications, past family history, past medical history, past social history, past surgical history and problem list      Past Medical History:   Diagnosis Date    Contact dermatitis     Last assessed - 7/12/12    Fatigue     Last assessed - 10/29/13    Insomnia     Last assessed - 9/25/12    Other muscle spasm     Resolved - 2/20/15       Past Surgical History:   Procedure Laterality Date    WISDOM TOOTH EXTRACTION         Family History   Problem Relation Age of Onset    No Known Problems Mother     Hypertension Father     Diabetes Father     No Known Problems Family          Medications have been verified          Objective   /99   Pulse (!) 113   Temp 100 1 °F (37 8 °C)   Ht 6' (1 829 m)   Wt 99 8 kg (220 lb)   SpO2 96% BMI 29 84 kg/m²   No LMP for male patient  Physical Exam     Physical Exam  Vitals and nursing note reviewed  Constitutional:       General: He is awake  He is not in acute distress  Appearance: Normal appearance  He is well-developed and well-groomed  He is not ill-appearing, toxic-appearing or diaphoretic  HENT:      Head: Normocephalic and atraumatic  Right Ear: Hearing, tympanic membrane, ear canal and external ear normal  There is no impacted cerumen  No foreign body  Left Ear: Hearing, tympanic membrane, ear canal and external ear normal  There is no impacted cerumen  No foreign body  Nose: Nose normal  No mucosal edema, congestion or rhinorrhea  Right Nostril: No foreign body, epistaxis or occlusion  Left Nostril: No foreign body, epistaxis or occlusion  Right Turbinates: Not enlarged, swollen or pale  Left Turbinates: Not enlarged, swollen or pale  Mouth/Throat:      Lips: Pink  No lesions  Mouth: Mucous membranes are moist  No injury, oral lesions or angioedema  Dentition: Normal dentition  Tongue: No lesions  Tongue does not deviate from midline  Palate: No mass and lesions  Pharynx: Uvula midline  No pharyngeal swelling, oropharyngeal exudate, posterior oropharyngeal erythema or uvula swelling  Tonsils: No tonsillar exudate or tonsillar abscesses  Eyes:      General: Lids are normal  Vision grossly intact  Gaze aligned appropriately  Cardiovascular:      Rate and Rhythm: Normal rate  Pulmonary:      Effort: Pulmonary effort is normal       Comments: Patient is speaking in full sentences with no increased respiratory effort  No audible wheezing or stridor  Musculoskeletal:      Cervical back: Normal range of motion  Lymphadenopathy:      Cervical: No cervical adenopathy  Skin:     General: Skin is warm and dry  Comments:   Skin is flushed and hot to touch      Neurological:      Mental Status: He is alert and oriented to person, place, and time  Coordination: Coordination is intact  Gait: Gait is intact  Psychiatric:         Attention and Perception: Attention and perception normal          Mood and Affect: Mood and affect normal          Speech: Speech normal          Behavior: Behavior normal  Behavior is cooperative  Note: Portions of this record may have been created with voice recognition software  Occasional wrong word or "sound a like" substitutions may have occurred due to the inherent limitations of voice recognition software  Please read the chart carefully and recognize, using context, where substitutions have occurred  *

## 2021-09-07 ENCOUNTER — TELEMEDICINE (OUTPATIENT)
Dept: FAMILY MEDICINE CLINIC | Facility: CLINIC | Age: 36
End: 2021-09-07
Payer: COMMERCIAL

## 2021-09-07 ENCOUNTER — AMB VIDEO VISIT (OUTPATIENT)
Dept: OTHER | Facility: HOSPITAL | Age: 36
End: 2021-09-07
Payer: COMMERCIAL

## 2021-09-07 DIAGNOSIS — K21.9 GASTROESOPHAGEAL REFLUX DISEASE, UNSPECIFIED WHETHER ESOPHAGITIS PRESENT: ICD-10-CM

## 2021-09-07 DIAGNOSIS — R52 BODY ACHES: ICD-10-CM

## 2021-09-07 DIAGNOSIS — R13.10 DYSPHAGIA, UNSPECIFIED TYPE: Primary | ICD-10-CM

## 2021-09-07 PROCEDURE — ECARE PR SL URGENT CARE VIRTUAL VISIT: Performed by: FAMILY MEDICINE

## 2021-09-07 PROCEDURE — 99213 OFFICE O/P EST LOW 20 MIN: CPT | Performed by: FAMILY MEDICINE

## 2021-09-07 NOTE — CARE ANYWHERE EVISITS
Visit Summary for [facility] - Gender: Male - Date of Birth: [id number]  Date: [id number] - Duration: 4 minutes  Patient: [facility]  Provider: Sarah Ann    Patient Contact Information  Address  [address]  [place]; [address]  [phone]    Visit Topics  Gas in my upper chest  Itâs hard to drink an eat [Added By: Self - 2021-09-07]    Triage Questions   What is your current physical address in the event of a medical emergency? Answer []  Are you allergic to any medications? Answer []  Are you now or could you be pregnant? Answer []  Do you have any immune system compromise or chronic lung   disease? Answer []  Do you have any vulnerable family members in the home (infant, pregnant, cancer, elderly)? Answer []     Conversation Transcripts  [0A][0A] [Notification] You are connected with Sarah Ann, Family Physician [0A][Notification] Sherill Boeck is located in [place]  [0A][Notification] Sherill Boeck has shared health history  [facility]  [0A][Notification] Sarah Ann has   added a diagnosis/procedure code  [0A]    Diagnosis  Dysphagia    Procedures    Medications Prescribed    No prescriptions ordered    Provider Notes  [0A][0A] [0A]We strongly encourage you to share the following record of today's visit with your primary care physician  [0A][0A][0A][0A]Contact phone number: [0A][0A][0A][0A]Mode of Communication: Video[0A][0A][0A][0A]HPI: The patient went in for a covid   test 2 days ago and had a negative covid test  The patient is having a hard time swallowing has not ate in 24 hours  Can swallow fluids can't swallow solids without struggle  Feels like bubble of gas  [0A][0A][0A][0A][0A][0A]PMH: None[0A][0A]PSH:   None[0A][0A]Meds: None[0A][0A]Allergies: NKDA[0A][0A][0A][0A]Exam: [0A][0A]Gen: Alert, normal mental status and interaction, no visible distress, non- toxic appearance  Mouth: Oropharynx normal  [0A][0A][0A][0A]Assessment: dysphagia[0A][0A][0A][0A]Plan: [0A][0A]1  I am recommending in person exam now at urgent care for further evaluation and treatment[0A][0A]2  Discussed precautions  [0A][0A][0A][0A]Follow up:[0A][0A]1  If there are any questions or problems with the prescription, call [phone]   anytime for assistance  [0A][0A]2  Please see an in-person provider now[0A][0A]3  Taking a probiotic (either in pill form or by eating yogurt that contains probiotics) while using antibiotics can help prevent some of the troublesome side effects that   antibiotics can sometimes cause [0A][0A]4  Please print a copy of this note and send it to your regular doctor, or take it to your next visit so it may be included in your medical record  [0A][0A][0A][0A]Patient voiced understanding and agrees to   plan [0A][0A][0A][0A]Please see your PCP on an annual basis  [0A]    Electronically signed by: Kandy Bustillos(NPI [id number])

## 2021-09-07 NOTE — PROGRESS NOTES
Virtual Regular Visit    Verification of patient location:    Patient is located in the following state in which I hold an active license [place]      Assessment/Plan:    Problem List Items Addressed This Visit        Digestive    Esophageal reflux      Other Visit Diagnoses     Dysphagia, unspecified type    -  Primary    Body aches                   Reason for visit is   Chief Complaint   Patient presents with    Virtual Regular Visit        Encounter provider Nikolai Gonzalez DO    Provider located at [address] [place] [address] [address] [facility] [place] [address] [phone]      Recent Visits  No visits were found meeting these conditions  Showing recent visits within past 7 days and meeting all other requirements  Today's Visits  Date Type Provider Dept   09/07/21 Telemedicine Nikolai Gonzalez [facility]   Showing today's visits and meeting all other requirements  Future Appointments  No visits were found meeting these conditions  Showing future appointments within next 150 days and meeting all other requirements       The patient was identified by name and date of birth  Areli Grijalva was informed that this is a telemedicine visit and that the visit is being conducted through [facility] and patient was informed that this is a secure, HIPAA-compliant platform  He agrees to proceed     My office door was closed  No one else was in the room  He acknowledged consent and understanding of privacy and security of the video platform  The patient has agreed to participate and understands they can discontinue the visit at any time  Patient is aware this is a billable service  Subjective  Areli Grijalva is a 39 y o  male here for trouble swallowing and gerd and hx of body aches, covid negative         Here for problems with gerd and swallowing difficulty with swallowing foods, no sob or respiratory difficulty and can drink fluids  COVID 19 negative, hx of body aches  No other complaints  Had Fever and joints hurt in recent past and had possible Gerd last night  Tried drinking gingerale and prilosec last night  Can feel in chest and has a hard time swallowing food and cannot burp  Past Medical History:   Diagnosis Date    Contact dermatitis     Last assessed - 7/12/12    Fatigue     Last assessed - 10/29/13    Insomnia     Last assessed - 9/25/12    Other muscle spasm     Resolved - 2/20/15       Past Surgical History:   Procedure Laterality Date    WISDOM TOOTH EXTRACTION         Current Outpatient Medications   Medication Sig Dispense Refill    albuterol (PROVENTIL HFA,VENTOLIN HFA) 90 mcg/act inhaler Inhale 2 puffs every 6 (six) hours as needed for wheezing 2 Inhaler 2    citalopram (CeleXA) 10 mg tablet Take 1 tablet (10 mg total) by mouth daily (Patient not taking: Reported on 1/14/2019 ) 30 tablet 5    fluticasone (FLONASE) 50 mcg/act nasal spray 1 spray into each nostril daily (Patient not taking: Reported on 1/14/2019 ) 16 g 0    predniSONE 10 mg tablet Take 60 mg po day#1, 50 mg po day#2, 40 mg po day#3, 30 mg po day#4, 20 mg po day#5m and 10 mg po day#6 (Patient not taking: Reported on 9/5/2021) 21 tablet 0    triamcinolone (KENALOG) 0 1 % cream APPLY TO NECK ONCE A WEEK AFTER SHAVING (Patient not taking: Reported on 9/5/2021)  0     No current facility-administered medications for this visit  No Known Allergies    Review of Systems   Constitutional: Negative  Negative for fever  HENT: Negative  Eyes: Negative  Respiratory: Negative  Negative for cough and shortness of breath  Cardiovascular: Negative  Gastrointestinal: Negative for blood in stool  Dysphagia, gerd   Endocrine: Negative  Genitourinary: Negative  Musculoskeletal:        Body aches   Skin: Negative  Allergic/Immunologic: Negative  Neurological: Negative  Hematological: Negative  Psychiatric/Behavioral: Negative  Video Exam    There were no vitals filed for this visit  Physical Exam  Constitutional:       Appearance: Normal appearance  HENT:      Head: Normocephalic and atraumatic  Eyes:      Conjunctiva/sclera: Conjunctivae normal    Pulmonary:      Effort: Pulmonary effort is normal  No respiratory distress  Skin:     Coloration: Skin is not pale  Neurological:      General: No focal deficit present  Mental Status: He is alert and oriented to person, place, and time  Psychiatric:         Mood and Affect: Mood normal          Behavior: Behavior normal          Thought Content: Thought content normal          Judgment: Judgment normal           I spent 20 minutes directly with the patient during this visit     Patient Instructions   [facility] - [phone]  COVID NEGATIVE, having issues lingering  Trouble swallowing  Take prilosec OTC daily, dental soft diet  email [email or web address] excuse for today and tomorrow  Consult [facility] for gerd and dysphagia and call if any problems  VIRTUAL VISIT DISCLAIMER      Norbert White verbally agrees to participate in [employer]  Pt is aware that [employer] could be limited without vital signs or the ability to perform a full hands-on physical exam  Gio Dan understands he or the provider may request at any time to terminate the video visit and request the patient to seek care or treatment in person

## 2021-12-08 ENCOUNTER — AMB VIDEO VISIT (OUTPATIENT)
Dept: OTHER | Facility: HOSPITAL | Age: 36
End: 2021-12-08

## 2021-12-08 PROCEDURE — ECARE PR SL URGENT CARE VIRTUAL VISIT: Performed by: FAMILY MEDICINE

## 2023-11-24 ENCOUNTER — TELEPHONE (OUTPATIENT)
Dept: FAMILY MEDICINE CLINIC | Facility: CLINIC | Age: 38
End: 2023-11-24

## 2023-12-06 ENCOUNTER — TELEPHONE (OUTPATIENT)
Dept: FAMILY MEDICINE CLINIC | Facility: CLINIC | Age: 38
End: 2023-12-06

## 2023-12-06 NOTE — TELEPHONE ENCOUNTER
I talked to patient re forms to take care of father and these forms state it needs to be completed by his father's doctor as we are not his father's physician. The forms state it is to be completed by his father's physician. Patient understood. Called at 625 pm 12/6/23.

## 2024-03-14 ENCOUNTER — OFFICE VISIT (OUTPATIENT)
Dept: URGENT CARE | Age: 39
End: 2024-03-14
Payer: COMMERCIAL

## 2024-03-14 VITALS
SYSTOLIC BLOOD PRESSURE: 146 MMHG | HEART RATE: 78 BPM | RESPIRATION RATE: 18 BRPM | OXYGEN SATURATION: 97 % | DIASTOLIC BLOOD PRESSURE: 90 MMHG

## 2024-03-14 DIAGNOSIS — S41.151A DOG BITE OF RIGHT UPPER EXTREMITY, INITIAL ENCOUNTER: Primary | ICD-10-CM
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
## 2024-03-14 PROCEDURE — 99213 OFFICE O/P EST LOW 20 MIN: CPT

## 2024-03-14 NOTE — PROGRESS NOTES
[facility]        NAME: Gio Marshall is a 38 y.o. male  : 1985    MRN: [id number]  DATE: 2024  TIME: 2:27 PM      Assessment and Plan     Dog bite of right upper extremity, initial encounter [S41.151A, W54.0XXA]  1. Dog bite of right upper extremity, initial encounter  Transfer to other facility      Patient agreeable to proceed to the ER for further evaluation given unknown rabies status of dog. Patient normally uses  for HC- patient to go to [facility] by POV. Wound patted dry with wound cleanser, non-adherent dressing and Kike applied.     Patient Instructions   Proceed to the ER for further evaluation.       Chief Complaint     Chief Complaint   Patient presents with    Animal Bite         History of Present Illness     Patient is a 38-year-old male who presents status post dog bite. States he is unsure if he got bit in the arm by his dog or his neighbor's dog. States his dog is UTD on rabies but is unsure if neighbors dog it. Punctures wounds to right forearm. Denies pain. Unknown last tetanus.         Review of Systems     Review of Systems   Musculoskeletal:  Negative for arthralgias.   Skin:  Positive for wound.   Neurological:  Negative for numbness.   All other systems reviewed and are negative.        Current Medications       Current Outpatient Medications:     albuterol (PROVENTIL HFA,VENTOLIN HFA) 90 mcg/act inhaler, Inhale 2 puffs every 6 (six) hours as needed for wheezing, Disp: 2 Inhaler, Rfl: 2    citalopram (CeleXA) 10 mg tablet, Take 1 tablet (10 mg total) by mouth daily (Patient not taking: Reported on 2019 ), Disp: 30 tablet, Rfl: 5    fluticasone (FLONASE) 50 mcg/act nasal spray, 1 spray into each nostril daily (Patient not taking: Reported on 2019 ), Disp: 16 g, Rfl: 0    predniSONE 10 mg tablet, Take 60 mg po day#1, 50 mg po day#2, 40 mg po day#3, 30 mg po day#4, 20 mg po day#5m and 10 mg po day#6 (Patient not taking: Reported on 2021), Disp: 21  tablet, Rfl: 0    triamcinolone (KENALOG) 0.1 % cream, APPLY TO NECK ONCE A WEEK AFTER SHAVING (Patient not taking: Reported on 9/5/2021), Disp: , Rfl: 0    Current Allergies     Allergies as of 03/14/2024    (No Known Allergies)              The following portions of the patient's history were reviewed and updated as appropriate: allergies, current medications, past family history, past medical history, past social history, past surgical history and problem list.     Past Medical History:   Diagnosis Date    Contact dermatitis     Last assessed - 7/12/12    Fatigue     Last assessed - 10/29/13    Insomnia     Last assessed - 9/25/12    Other muscle spasm     Resolved - 2/20/15       Past Surgical History:   Procedure Laterality Date    WISDOM TOOTH EXTRACTION         Family History   Problem Relation Age of Onset    No Known Problems Mother     Hypertension Father     Diabetes Father     No Known Problems Family          Medications have been verified.        Objective     /90   Pulse 78   Resp 18   SpO2 97%   No LMP for male patient.         Physical Exam     Physical Exam  Vitals and nursing note reviewed.   Constitutional:       General: He is not in acute distress.     Appearance: Normal appearance. He is diaphoretic. He is not ill-appearing or toxic-appearing.   Musculoskeletal:      Right forearm: Swelling (localized swelling), laceration (multiple puncture wounds) and tenderness present. No deformity or bony tenderness.      Right hand: Normal capillary refill. Normal pulse.        Arms:       Comments: Normal sensation.      Skin:     General: Skin is warm.      Capillary Refill: Capillary refill takes less than 2 seconds.   Neurological:      Mental Status: He is alert.   Psychiatric:         Behavior: Behavior normal.

## 2024-07-08 NOTE — LETTER
Refill Routing Note   Medication(s) are not appropriate for processing by [facility] for the following reason(s):        Patient not seen by provider within 15 months    [facility] action(s):  Defer               Appointments  past 12m or future 3m with PCP    Date Provider   Last Visit   10/12/2022 Bharati Marcus MD   Next Visit   Visit date not found Bharati Marcus MD   ED visits in past 90 days: 0        Note composed:9:43 AM 07/08/2024            September 27, 2018     Patient: Emily Phoenix   YOB: 1985   Date of Visit: 9/27/2018       To Whom it May Concern:    Francisco Gordillo is under my professional care  He was seen in my office on 9/27/2018  He may return to work on 9/28/2018  If you have any questions or concerns, please don't hesitate to call           Sincerely,          Dhiraj Maria DO        CC: No Recipients

## 2024-10-09 ENCOUNTER — OFFICE VISIT (OUTPATIENT)
Dept: URGENT CARE | Age: 39
End: 2024-10-09
Payer: COMMERCIAL

## 2024-10-09 VITALS
DIASTOLIC BLOOD PRESSURE: 104 MMHG | WEIGHT: 228 LBS | TEMPERATURE: 97.8 F | RESPIRATION RATE: 22 BRPM | SYSTOLIC BLOOD PRESSURE: 148 MMHG | BODY MASS INDEX: 30.88 KG/M2 | HEIGHT: 72 IN | OXYGEN SATURATION: 98 % | HEART RATE: 71 BPM

## 2024-10-09 DIAGNOSIS — B96.89 ACUTE BACTERIAL SINUSITIS: Primary | ICD-10-CM

## 2024-10-09 DIAGNOSIS — J01.90 ACUTE BACTERIAL SINUSITIS: Primary | ICD-10-CM

## 2024-10-09 PROCEDURE — 99213 OFFICE O/P EST LOW 20 MIN: CPT

## 2024-10-09 RX ORDER — AMOXICILLIN 875 MG
875 TABLET ORAL 2 TIMES DAILY
Qty: 14 TABLET | Refills: 0 | Status: SHIPPED | OUTPATIENT
Start: 2024-10-09 | End: 2024-10-16

## 2024-10-09 RX ORDER — PREDNISONE 20 MG/1
20 TABLET ORAL 2 TIMES DAILY
Qty: 10 TABLET | Refills: 0 | Status: SHIPPED | OUTPATIENT
Start: 2024-10-09 | End: 2024-10-14

## 2024-10-09 NOTE — PATIENT INSTRUCTIONS
Take antibiotics and steroids as prescribed for Sinus Infection  Stop the decongestants you are currently taking as that can be raising your blood pressure.  For decongestion, Over The Counter medications:  Nasal corticosteroid: examples are Flonase or Nasacort.  Nasal saline irrigation  Humidified air  Warm moist air such as a hot cup of water in a mug, sit at the dining room table with the mug on the table, put a towel over your head to cover over the mug and breath in the warm steam (don't drink the fluid in case you have mucus that drips in).  Vicks Vapor Rub  Over the counter Mucinex and increase you fluid intake.  For Cough or sore throat:  Salt water gurgle  Teaspoon of Honey up to 3x/day  Chloraseptic spray  Throat lozenges  Over the Counter Tylenol or Ibuprofen  Dextromethorphan 30mg PO every 6-8 hours for cough. max 120 mg in 24 hour period.      Follow up with Primary Care Provider for recheck and monitoring of your blood pressure which was elevated in the office today.  Proceed to Emergency Department if symptoms worsen.    If tests have been performed at [facility], our office will contact you with results if changes need to be made to the care plan discussed with you at the visit.  You can review your full results on [facility].

## 2024-10-09 NOTE — PROGRESS NOTES
[facility]        NAME: Gio Marshall is a 39 y.o. male  : 1985    MRN: [id number]  DATE: 2024  TIME: 4:25 PM    Assessment and Plan   Acute bacterial sinusitis [J01.90, B96.89]  1. Acute bacterial sinusitis  amoxicillin (AMOXIL) 875 mg tablet    predniSONE 20 mg tablet        Discussed following up with his PCP regarding his elevated BP reading today and states he currently does not have one as it has been over 2 years since he last saw his PCP. He also reports that he used to have elevated BP but improved when he lost weight but recently gained weight due to passing of his father. Encouraged patient to re-establish care with PCP and follow up with his health.    Patient Instructions   Take antibiotics and steroids as prescribed for Sinus Infection  Stop the decongestants you are currently taking as that can be raising your blood pressure.  For decongestion, Over The Counter medications:  Nasal corticosteroid: examples are Flonase or Nasacort.  Nasal saline irrigation  Humidified air  Warm moist air such as a hot cup of water in a mug, sit at the dining room table with the mug on the table, put a towel over your head to cover over the mug and breath in the warm steam (don't drink the fluid in case you have mucus that drips in).  Vicks Vapor Rub  Over the counter Mucinex and increase you fluid intake.  For Cough or sore throat:  Salt water gurgle  Teaspoon of Honey up to 3x/day  Chloraseptic spray  Throat lozenges  Over the Counter Tylenol or Ibuprofen  Dextromethorphan 30mg PO every 6-8 hours for cough. max 120 mg in 24 hour period.      Follow up with Primary Care Provider for recheck and monitoring of your blood pressure which was elevated in the office today.  Proceed to Emergency Department if symptoms worsen.    If tests have been performed at [facility], our office will contact you with results if changes need to be made to the care plan discussed with you at the visit.  You  can review your full results on [facility].    Chief Complaint     Chief Complaint   Patient presents with    Sinusitis     Patient reports possible sinus infection that started ~2 days ago. Patient reports phlegm in throat. Reports congestion. Reports taking Tylenol decongestant with some improvement but symptoms returned. Denies fever or chills. Denies vomiting or diarrhea.          History of Present Illness       Patient reports nasal congestion with thick drainage along with pain in left cheek starting 2 days ago. Has been taking Tylenol cold and flu medication and states the decongestant does help.    Sinusitis  Associated symptoms include congestion, coughing and sinus pressure. Pertinent negatives include no chills, ear pain, shortness of breath or sore throat.       Review of Systems   Review of Systems   Constitutional:  Negative for chills and fever.   HENT:  Positive for congestion, postnasal drip, rhinorrhea, sinus pressure and sinus pain. Negative for ear pain and sore throat.    Eyes:  Negative for pain and visual disturbance.   Respiratory:  Positive for cough. Negative for shortness of breath.    Cardiovascular:  Negative for chest pain and palpitations.   Gastrointestinal:  Negative for abdominal pain and vomiting.   Genitourinary:  Negative for dysuria and hematuria.   Musculoskeletal:  Negative for arthralgias and back pain.   Skin:  Negative for color change and rash.   Neurological:  Negative for dizziness, seizures, syncope, weakness and light-headedness.   All other systems reviewed and are negative.        Current Medications       Current Outpatient Medications:     amoxicillin (AMOXIL) 875 mg tablet, Take 1 tablet (875 mg total) by mouth 2 (two) times a day for 7 days, Disp: 14 tablet, Rfl: 0    predniSONE 20 mg tablet, Take 1 tablet (20 mg total) by mouth 2 (two) times a day for 5 days, Disp: 10 tablet, Rfl: 0    albuterol (PROVENTIL HFA,VENTOLIN HFA) 90 mcg/act inhaler, Inhale 2  puffs every 6 (six) hours as needed for wheezing (Patient not taking: Reported on 10/9/2024), Disp: 2 Inhaler, Rfl: 2    citalopram (CeleXA) 10 mg tablet, Take 1 tablet (10 mg total) by mouth daily (Patient not taking: Reported on 1/14/2019), Disp: 30 tablet, Rfl: 5    fluticasone (FLONASE) 50 mcg/act nasal spray, 1 spray into each nostril daily (Patient not taking: Reported on 1/14/2019), Disp: 16 g, Rfl: 0    triamcinolone (KENALOG) 0.1 % cream, APPLY TO NECK ONCE A WEEK AFTER SHAVING (Patient not taking: No sig reported), Disp: , Rfl: 0    Current Allergies     Allergies as of 10/09/2024    (No Known Allergies)            The following portions of the patient's history were reviewed and updated as appropriate: allergies, current medications, past family history, past medical history, past social history, past surgical history and problem list.     Past Medical History:   Diagnosis Date    Contact dermatitis     Last assessed - 7/12/12    Fatigue     Last assessed - 10/29/13    Insomnia     Last assessed - 9/25/12    Other muscle spasm     Resolved - 2/20/15       Past Surgical History:   Procedure Laterality Date    WISDOM TOOTH EXTRACTION         Family History   Problem Relation Age of Onset    No Known Problems Mother     Hypertension Father     Diabetes Father     No Known Problems Family          Medications have been verified.        Objective   BP (!) 148/104 (BP Location: Right arm, Patient Position: Sitting, Cuff Size: Extra-Large)   Pulse 71   Temp 97.8 °F (36.6 °C) (Tympanic)   Resp 22   Ht 6' (1.829 m)   Wt 103 kg (228 lb)   SpO2 98%   BMI 30.92 kg/m²   No LMP for male patient.       Physical Exam     Physical Exam  Vitals and nursing note reviewed.   Constitutional:       Appearance: Normal appearance.   HENT:      Head: Normocephalic and atraumatic.      Right Ear: Tympanic membrane normal.      Left Ear: Tympanic membrane normal.      Nose: Congestion and rhinorrhea present. Rhinorrhea is  purulent.      Right Sinus: No maxillary sinus tenderness or frontal sinus tenderness.      Left Sinus: Maxillary sinus tenderness present. No frontal sinus tenderness.      Mouth/Throat:      Mouth: Mucous membranes are moist.   Eyes:      Pupils: Pupils are equal, round, and reactive to light.   Pulmonary:      Effort: Pulmonary effort is normal.   Skin:     General: Skin is warm and dry.      Capillary Refill: Capillary refill takes less than 2 seconds.   Neurological:      General: No focal deficit present.      Mental Status: He is alert and oriented to person, place, and time. Mental status is at baseline.      Sensory: No sensory deficit.      Motor: No weakness.   Psychiatric:         Mood and Affect: Mood normal.         Behavior: Behavior normal.         Thought Content: Thought content normal.

## 2024-10-10 ENCOUNTER — DOCUMENTATION (OUTPATIENT)
Dept: ADMINISTRATIVE | Facility: OTHER | Age: 39
End: 2024-10-10

## 2024-10-10 NOTE — PROGRESS NOTES
FRANCISCO Calderón  P Patient Reported Team         Blood pressure elevated  Appointment department: [facility]  Appointment provider: FRANCISCO Calderón  Blood pressure  10/09/24 1625 (!) 148/104  10/09/24 1613 (!) 179/114    10/10/24 12:36 PM    Patient was called after the Urgent Care visit Patient declined to schedule appointment.    Thank you.  Hansel Ramos MA  [facility]

## 2024-12-23 ENCOUNTER — TELEPHONE (OUTPATIENT)
Age: 39
End: 2024-12-23

## 2024-12-23 ENCOUNTER — OFFICE VISIT (OUTPATIENT)
Dept: URGENT CARE | Age: 39
End: 2024-12-23
Payer: COMMERCIAL

## 2024-12-23 VITALS
HEIGHT: 72 IN | SYSTOLIC BLOOD PRESSURE: 130 MMHG | HEART RATE: 88 BPM | DIASTOLIC BLOOD PRESSURE: 78 MMHG | RESPIRATION RATE: 18 BRPM | WEIGHT: 234.6 LBS | OXYGEN SATURATION: 98 % | TEMPERATURE: 99.9 F | BODY MASS INDEX: 31.77 KG/M2

## 2024-12-23 DIAGNOSIS — R05.1 ACUTE COUGH: Primary | ICD-10-CM

## 2024-12-23 PROCEDURE — 99213 OFFICE O/P EST LOW 20 MIN: CPT | Performed by: EMERGENCY MEDICINE

## 2024-12-23 RX ORDER — ALBUTEROL SULFATE 90 UG/1
2 INHALANT RESPIRATORY (INHALATION) EVERY 6 HOURS PRN
Qty: 8.5 G | Refills: 0 | Status: SHIPPED | OUTPATIENT
Start: 2024-12-23

## 2024-12-23 NOTE — TELEPHONE ENCOUNTER
Pt went to  for being sick and was given a note for work however he needs it to state that he is going back to work 12/25 not 12/24. UC would not change the note so pt is wondering if PCP office can. Please advise and inform when complete

## 2024-12-23 NOTE — PROGRESS NOTES
"  [facility]        NAME: Gio Marshall is a 39 y.o. male  : 1985    MRN: [id number]  DATE: 2024  TIME: 4:11 PM    Assessment and Plan   Acute cough [R05.1]  1. Acute cough  albuterol (ProAir HFA) 90 mcg/act inhaler        Pt presents for eval of cough. Had [employer] party with 25 people recently. Believes he had a fever. Afebrile currently. No real complaints . States \" Im fine\"Came because wife is pregnant and he needs a work note. Requested a note for tomorrow \" In case I don't sleep well\". Afebrile, work not request declined    Patient Instructions       Follow up with PCP in 3-5 days.  Proceed to  ER if symptoms worsen.    If tests have been performed at [facility], our office will contact you with results if changes need to be made to the care plan discussed with you at the visit.  You can review your full results on [facility] MyChart.    Chief Complaint     Chief Complaint   Patient presents with    Fever    Cold Like Symptoms     C/o symptoms for the past 2 days, wants to get checked, wife is pregnant.          History of Present Illness       Pt presents for eval of cough. Had [employer] party with 25 people recently. Believes he had a fever. Afebrile currently. No real complaints . States \" Im fine\"Came because wife is pregnant and he needs a work note. Requested a note for tomorrow \" In case I don't sleep well\". Afebrile, work not request declined        Review of Systems   Review of Systems   Constitutional:  Negative for fever.   HENT:  Positive for congestion.    Respiratory:  Positive for cough. Negative for shortness of breath and wheezing.    All other systems reviewed and are negative.        Current Medications       Current Outpatient Medications:     albuterol (ProAir HFA) 90 mcg/act inhaler, Inhale 2 puffs every 6 (six) hours as needed for wheezing or shortness of breath, Disp: 8.5 g, Rfl: 0    citalopram (CeleXA) 10 mg tablet, Take 1 tablet (10 mg total) by " mouth daily (Patient not taking: Reported on 1/14/2019), Disp: 30 tablet, Rfl: 5    fluticasone (FLONASE) 50 mcg/act nasal spray, 1 spray into each nostril daily (Patient not taking: Reported on 1/14/2019), Disp: 16 g, Rfl: 0    triamcinolone (KENALOG) 0.1 % cream, APPLY TO NECK ONCE A WEEK AFTER SHAVING (Patient not taking: No sig reported), Disp: , Rfl: 0    Current Allergies     Allergies as of 12/23/2024    (No Known Allergies)            The following portions of the patient's history were reviewed and updated as appropriate: allergies, current medications, past family history, past medical history, past social history, past surgical history and problem list.     Past Medical History:   Diagnosis Date    Contact dermatitis     Last assessed - 7/12/12    Fatigue     Last assessed - 10/29/13    Insomnia     Last assessed - 9/25/12    Other muscle spasm     Resolved - 2/20/15       Past Surgical History:   Procedure Laterality Date    WISDOM TOOTH EXTRACTION         Family History   Problem Relation Age of Onset    No Known Problems Mother     Hypertension Father     Diabetes Father     No Known Problems Family          Medications have been verified.        Objective   /78   Pulse 88   Temp 99.9 °F (37.7 °C) (Tympanic)   Resp 18   Ht 6' (1.829 m)   Wt 106 kg (234 lb 9.6 oz)   SpO2 98%   BMI 31.82 kg/m²   No LMP for male patient.       Physical Exam     Physical Exam  Vitals reviewed.   Constitutional:       Appearance: Normal appearance.   HENT:      Right Ear: Tympanic membrane normal.      Left Ear: Tympanic membrane normal.      Nose: Congestion present. No rhinorrhea.   Cardiovascular:      Rate and Rhythm: Normal rate and regular rhythm.      Pulses: Normal pulses.      Heart sounds: Normal heart sounds.   Pulmonary:      Effort: Pulmonary effort is normal.      Breath sounds: Normal breath sounds.   Musculoskeletal:         General: Normal range of motion.   Lymphadenopathy:      Cervical: No  cervical adenopathy.   Skin:     General: Skin is warm and dry.   Neurological:      Mental Status: He is alert.

## 2024-12-23 NOTE — LETTER
December 23, 2024     Patient: Gio Marshall   YOB: 1985   Date of Visit: 12/23/2024       To Whom it May Concern:    Gio Marshall was seen in my clinic on 12/23/2024. He may return to work on 12/24/2024 .    If you have any questions or concerns, please don't hesitate to call.         Sincerely,          FRANCISCO Polo        CC: No Recipients

## 2024-12-24 NOTE — TELEPHONE ENCOUNTER
Called patient, left message, has not been seen in 3yrs, we will not be able to write a note. Patient overdue for annual.

## 2024-12-24 NOTE — TELEPHONE ENCOUNTER
Patient returned call. Advised per note, he hasn't been seen in 3yrs, office will not be able to write him a note and he is overdue for an Annual. I asked if he wanted to schedule his Annual, and he refused.